# Patient Record
Sex: MALE | Race: WHITE | NOT HISPANIC OR LATINO | Employment: OTHER | URBAN - METROPOLITAN AREA
[De-identification: names, ages, dates, MRNs, and addresses within clinical notes are randomized per-mention and may not be internally consistent; named-entity substitution may affect disease eponyms.]

---

## 2021-01-15 DIAGNOSIS — Z23 NEED FOR VACCINATION: ICD-10-CM

## 2023-03-13 ENCOUNTER — APPOINTMENT (OUTPATIENT)
Dept: ADMISSIONS | Facility: MEDICAL CENTER | Age: 78
End: 2023-03-13
Attending: NEUROLOGICAL SURGERY
Payer: COMMERCIAL

## 2023-03-15 ENCOUNTER — PRE-ADMISSION TESTING (OUTPATIENT)
Dept: ADMISSIONS | Facility: MEDICAL CENTER | Age: 78
End: 2023-03-15
Attending: NEUROLOGICAL SURGERY
Payer: COMMERCIAL

## 2023-03-15 RX ORDER — TERAZOSIN 2 MG/1
2 CAPSULE ORAL NIGHTLY
COMMUNITY

## 2023-03-15 RX ORDER — FINASTERIDE 5 MG/1
5 TABLET, FILM COATED ORAL DAILY
COMMUNITY

## 2023-03-15 RX ORDER — CHLORAL HYDRATE 500 MG
1000 CAPSULE ORAL
Status: ON HOLD | COMMUNITY
End: 2023-03-16

## 2023-03-16 ENCOUNTER — ANESTHESIA (OUTPATIENT)
Dept: SURGERY | Facility: MEDICAL CENTER | Age: 78
End: 2023-03-16
Payer: COMMERCIAL

## 2023-03-16 ENCOUNTER — HOSPITAL ENCOUNTER (OUTPATIENT)
Facility: MEDICAL CENTER | Age: 78
End: 2023-03-18
Attending: NEUROLOGICAL SURGERY | Admitting: NEUROLOGICAL SURGERY
Payer: COMMERCIAL

## 2023-03-16 ENCOUNTER — ANESTHESIA EVENT (OUTPATIENT)
Dept: SURGERY | Facility: MEDICAL CENTER | Age: 78
End: 2023-03-16
Payer: COMMERCIAL

## 2023-03-16 ENCOUNTER — APPOINTMENT (OUTPATIENT)
Dept: RADIOLOGY | Facility: MEDICAL CENTER | Age: 78
End: 2023-03-16
Attending: NEUROLOGICAL SURGERY
Payer: COMMERCIAL

## 2023-03-16 DIAGNOSIS — G89.18 ACUTE POST-OPERATIVE PAIN: ICD-10-CM

## 2023-03-16 DIAGNOSIS — M48.062 LUMBAR STENOSIS WITH NEUROGENIC CLAUDICATION: ICD-10-CM

## 2023-03-16 LAB — GLUCOSE BLD STRIP.AUTO-MCNC: 142 MG/DL (ref 65–99)

## 2023-03-16 PROCEDURE — 110371 HCHG SHELL REV 272: Performed by: NEUROLOGICAL SURGERY

## 2023-03-16 PROCEDURE — 700101 HCHG RX REV CODE 250: Performed by: NURSE PRACTITIONER

## 2023-03-16 PROCEDURE — 700102 HCHG RX REV CODE 250 W/ 637 OVERRIDE(OP): Performed by: NURSE PRACTITIONER

## 2023-03-16 PROCEDURE — 700111 HCHG RX REV CODE 636 W/ 250 OVERRIDE (IP): Performed by: NEUROLOGICAL SURGERY

## 2023-03-16 PROCEDURE — 700101 HCHG RX REV CODE 250: Performed by: NEUROLOGICAL SURGERY

## 2023-03-16 PROCEDURE — 82962 GLUCOSE BLOOD TEST: CPT

## 2023-03-16 PROCEDURE — 700105 HCHG RX REV CODE 258: Performed by: NURSE PRACTITIONER

## 2023-03-16 PROCEDURE — 160035 HCHG PACU - 1ST 60 MINS PHASE I: Performed by: NEUROLOGICAL SURGERY

## 2023-03-16 PROCEDURE — G0378 HOSPITAL OBSERVATION PER HR: HCPCS

## 2023-03-16 PROCEDURE — 700111 HCHG RX REV CODE 636 W/ 250 OVERRIDE (IP): Performed by: ANESTHESIOLOGY

## 2023-03-16 PROCEDURE — 700105 HCHG RX REV CODE 258: Performed by: NEUROLOGICAL SURGERY

## 2023-03-16 PROCEDURE — 700102 HCHG RX REV CODE 250 W/ 637 OVERRIDE(OP): Performed by: ANESTHESIOLOGY

## 2023-03-16 PROCEDURE — 110454 HCHG SHELL REV 250: Performed by: NEUROLOGICAL SURGERY

## 2023-03-16 PROCEDURE — 700101 HCHG RX REV CODE 250: Performed by: ANESTHESIOLOGY

## 2023-03-16 PROCEDURE — 160041 HCHG SURGERY MINUTES - EA ADDL 1 MIN LEVEL 4: Performed by: NEUROLOGICAL SURGERY

## 2023-03-16 PROCEDURE — A9270 NON-COVERED ITEM OR SERVICE: HCPCS | Performed by: ANESTHESIOLOGY

## 2023-03-16 PROCEDURE — 700111 HCHG RX REV CODE 636 W/ 250 OVERRIDE (IP): Performed by: NURSE PRACTITIONER

## 2023-03-16 PROCEDURE — 99100 ANES PT EXTEME AGE<1 YR&>70: CPT | Performed by: ANESTHESIOLOGY

## 2023-03-16 PROCEDURE — 160009 HCHG ANES TIME/MIN: Performed by: NEUROLOGICAL SURGERY

## 2023-03-16 PROCEDURE — 160029 HCHG SURGERY MINUTES - 1ST 30 MINS LEVEL 4: Performed by: NEUROLOGICAL SURGERY

## 2023-03-16 PROCEDURE — A9270 NON-COVERED ITEM OR SERVICE: HCPCS | Performed by: NURSE PRACTITIONER

## 2023-03-16 PROCEDURE — 160002 HCHG RECOVERY MINUTES (STAT): Performed by: NEUROLOGICAL SURGERY

## 2023-03-16 PROCEDURE — 72020 X-RAY EXAM OF SPINE 1 VIEW: CPT

## 2023-03-16 PROCEDURE — 00630 ANES PX LUMBAR REGION NOS: CPT | Performed by: ANESTHESIOLOGY

## 2023-03-16 PROCEDURE — 160048 HCHG OR STATISTICAL LEVEL 1-5: Performed by: NEUROLOGICAL SURGERY

## 2023-03-16 PROCEDURE — 160036 HCHG PACU - EA ADDL 30 MINS PHASE I: Performed by: NEUROLOGICAL SURGERY

## 2023-03-16 RX ORDER — TIZANIDINE 4 MG/1
2 TABLET ORAL EVERY 8 HOURS PRN
Qty: 30 TABLET | Refills: 0
Start: 2023-03-16

## 2023-03-16 RX ORDER — LABETALOL HYDROCHLORIDE 5 MG/ML
10 INJECTION, SOLUTION INTRAVENOUS
Status: DISCONTINUED | OUTPATIENT
Start: 2023-03-16 | End: 2023-03-18 | Stop reason: HOSPADM

## 2023-03-16 RX ORDER — AMOXICILLIN 250 MG
1 CAPSULE ORAL
Status: DISCONTINUED | OUTPATIENT
Start: 2023-03-16 | End: 2023-03-18 | Stop reason: HOSPADM

## 2023-03-16 RX ORDER — FINASTERIDE 5 MG/1
5 TABLET, FILM COATED ORAL DAILY
Status: DISCONTINUED | OUTPATIENT
Start: 2023-03-16 | End: 2023-03-18 | Stop reason: HOSPADM

## 2023-03-16 RX ORDER — ONDANSETRON 4 MG/1
4 TABLET, ORALLY DISINTEGRATING ORAL EVERY 4 HOURS PRN
Status: DISCONTINUED | OUTPATIENT
Start: 2023-03-16 | End: 2023-03-18 | Stop reason: HOSPADM

## 2023-03-16 RX ORDER — BUPIVACAINE HYDROCHLORIDE AND EPINEPHRINE 5; 5 MG/ML; UG/ML
INJECTION, SOLUTION EPIDURAL; INTRACAUDAL; PERINEURAL
Status: DISCONTINUED | OUTPATIENT
Start: 2023-03-16 | End: 2023-03-16 | Stop reason: HOSPADM

## 2023-03-16 RX ORDER — HYDROMORPHONE HYDROCHLORIDE 1 MG/ML
0.2 INJECTION, SOLUTION INTRAMUSCULAR; INTRAVENOUS; SUBCUTANEOUS
Status: DISCONTINUED | OUTPATIENT
Start: 2023-03-16 | End: 2023-03-16 | Stop reason: HOSPADM

## 2023-03-16 RX ORDER — EPHEDRINE SULFATE 50 MG/ML
5 INJECTION, SOLUTION INTRAVENOUS
Status: DISCONTINUED | OUTPATIENT
Start: 2023-03-16 | End: 2023-03-16 | Stop reason: HOSPADM

## 2023-03-16 RX ORDER — POLYETHYLENE GLYCOL 3350 17 G/17G
1 POWDER, FOR SOLUTION ORAL 2 TIMES DAILY PRN
Status: DISCONTINUED | OUTPATIENT
Start: 2023-03-16 | End: 2023-03-18 | Stop reason: HOSPADM

## 2023-03-16 RX ORDER — PANTOPRAZOLE SODIUM 20 MG/1
20 TABLET, DELAYED RELEASE ORAL DAILY
Status: DISCONTINUED | OUTPATIENT
Start: 2023-03-16 | End: 2023-03-16

## 2023-03-16 RX ORDER — CEFAZOLIN SODIUM 1 G/3ML
INJECTION, POWDER, FOR SOLUTION INTRAMUSCULAR; INTRAVENOUS PRN
Status: DISCONTINUED | OUTPATIENT
Start: 2023-03-16 | End: 2023-03-16 | Stop reason: SURG

## 2023-03-16 RX ORDER — OXYCODONE HYDROCHLORIDE 10 MG/1
10 TABLET ORAL
Status: DISCONTINUED | OUTPATIENT
Start: 2023-03-16 | End: 2023-03-18 | Stop reason: HOSPADM

## 2023-03-16 RX ORDER — LIDOCAINE HYDROCHLORIDE 20 MG/ML
INJECTION, SOLUTION EPIDURAL; INFILTRATION; INTRACAUDAL; PERINEURAL PRN
Status: DISCONTINUED | OUTPATIENT
Start: 2023-03-16 | End: 2023-03-16 | Stop reason: SURG

## 2023-03-16 RX ORDER — HYDRALAZINE HYDROCHLORIDE 20 MG/ML
10 INJECTION INTRAMUSCULAR; INTRAVENOUS
Status: DISCONTINUED | OUTPATIENT
Start: 2023-03-16 | End: 2023-03-18 | Stop reason: HOSPADM

## 2023-03-16 RX ORDER — PHENYLEPHRINE HCL IN 0.9% NACL 0.5 MG/5ML
SYRINGE (ML) INTRAVENOUS PRN
Status: DISCONTINUED | OUTPATIENT
Start: 2023-03-16 | End: 2023-03-16 | Stop reason: SURG

## 2023-03-16 RX ORDER — HYDRALAZINE HYDROCHLORIDE 20 MG/ML
5 INJECTION INTRAMUSCULAR; INTRAVENOUS
Status: DISCONTINUED | OUTPATIENT
Start: 2023-03-16 | End: 2023-03-16 | Stop reason: HOSPADM

## 2023-03-16 RX ORDER — HALOPERIDOL 5 MG/ML
1 INJECTION INTRAMUSCULAR
Status: DISCONTINUED | OUTPATIENT
Start: 2023-03-16 | End: 2023-03-16 | Stop reason: HOSPADM

## 2023-03-16 RX ORDER — HYDROMORPHONE HYDROCHLORIDE 1 MG/ML
0.5 INJECTION, SOLUTION INTRAMUSCULAR; INTRAVENOUS; SUBCUTANEOUS
Status: DISCONTINUED | OUTPATIENT
Start: 2023-03-16 | End: 2023-03-18 | Stop reason: HOSPADM

## 2023-03-16 RX ORDER — SIMVASTATIN 40 MG
40 TABLET ORAL NIGHTLY
Status: DISCONTINUED | OUTPATIENT
Start: 2023-03-16 | End: 2023-03-18 | Stop reason: HOSPADM

## 2023-03-16 RX ORDER — TIZANIDINE 4 MG/1
2 TABLET ORAL 3 TIMES DAILY PRN
Status: DISCONTINUED | OUTPATIENT
Start: 2023-03-16 | End: 2023-03-18 | Stop reason: HOSPADM

## 2023-03-16 RX ORDER — HYDROMORPHONE HYDROCHLORIDE 1 MG/ML
0.4 INJECTION, SOLUTION INTRAMUSCULAR; INTRAVENOUS; SUBCUTANEOUS
Status: DISCONTINUED | OUTPATIENT
Start: 2023-03-16 | End: 2023-03-16 | Stop reason: HOSPADM

## 2023-03-16 RX ORDER — OMEPRAZOLE 20 MG/1
20 CAPSULE, DELAYED RELEASE ORAL DAILY
Status: DISCONTINUED | OUTPATIENT
Start: 2023-03-16 | End: 2023-03-18 | Stop reason: HOSPADM

## 2023-03-16 RX ORDER — BISACODYL 10 MG
10 SUPPOSITORY, RECTAL RECTAL
Status: DISCONTINUED | OUTPATIENT
Start: 2023-03-16 | End: 2023-03-18 | Stop reason: HOSPADM

## 2023-03-16 RX ORDER — DIPHENHYDRAMINE HYDROCHLORIDE 50 MG/ML
25 INJECTION INTRAMUSCULAR; INTRAVENOUS EVERY 6 HOURS PRN
Status: DISCONTINUED | OUTPATIENT
Start: 2023-03-16 | End: 2023-03-18 | Stop reason: HOSPADM

## 2023-03-16 RX ORDER — OXYCODONE HYDROCHLORIDE AND ACETAMINOPHEN 5; 325 MG/1; MG/1
1 TABLET ORAL EVERY 4 HOURS PRN
Qty: 42 TABLET | Refills: 0
Start: 2023-03-16 | End: 2023-03-23

## 2023-03-16 RX ORDER — OXYCODONE HYDROCHLORIDE 5 MG/1
5 TABLET ORAL
Status: DISCONTINUED | OUTPATIENT
Start: 2023-03-16 | End: 2023-03-18 | Stop reason: HOSPADM

## 2023-03-16 RX ORDER — ENEMA 19; 7 G/133ML; G/133ML
1 ENEMA RECTAL
Status: DISCONTINUED | OUTPATIENT
Start: 2023-03-16 | End: 2023-03-18 | Stop reason: HOSPADM

## 2023-03-16 RX ORDER — DIPHENHYDRAMINE HCL 25 MG
25 TABLET ORAL EVERY 6 HOURS PRN
Status: DISCONTINUED | OUTPATIENT
Start: 2023-03-16 | End: 2023-03-18 | Stop reason: HOSPADM

## 2023-03-16 RX ORDER — DEXAMETHASONE SODIUM PHOSPHATE 4 MG/ML
INJECTION, SOLUTION INTRA-ARTICULAR; INTRALESIONAL; INTRAMUSCULAR; INTRAVENOUS; SOFT TISSUE PRN
Status: DISCONTINUED | OUTPATIENT
Start: 2023-03-16 | End: 2023-03-16 | Stop reason: SURG

## 2023-03-16 RX ORDER — SODIUM CHLORIDE AND POTASSIUM CHLORIDE 150; 900 MG/100ML; MG/100ML
INJECTION, SOLUTION INTRAVENOUS CONTINUOUS
Status: DISCONTINUED | OUTPATIENT
Start: 2023-03-16 | End: 2023-03-18 | Stop reason: HOSPADM

## 2023-03-16 RX ORDER — LISINOPRIL 20 MG/1
20 TABLET ORAL DAILY
Status: DISCONTINUED | OUTPATIENT
Start: 2023-03-16 | End: 2023-03-18 | Stop reason: HOSPADM

## 2023-03-16 RX ORDER — ONDANSETRON 2 MG/ML
4 INJECTION INTRAMUSCULAR; INTRAVENOUS EVERY 4 HOURS PRN
Status: DISCONTINUED | OUTPATIENT
Start: 2023-03-16 | End: 2023-03-18 | Stop reason: HOSPADM

## 2023-03-16 RX ORDER — CEFAZOLIN SODIUM 1 G/3ML
INJECTION, POWDER, FOR SOLUTION INTRAMUSCULAR; INTRAVENOUS
Status: DISCONTINUED | OUTPATIENT
Start: 2023-03-16 | End: 2023-03-16 | Stop reason: HOSPADM

## 2023-03-16 RX ORDER — ONDANSETRON 2 MG/ML
4 INJECTION INTRAMUSCULAR; INTRAVENOUS
Status: DISCONTINUED | OUTPATIENT
Start: 2023-03-16 | End: 2023-03-16 | Stop reason: HOSPADM

## 2023-03-16 RX ORDER — SODIUM CHLORIDE, SODIUM LACTATE, POTASSIUM CHLORIDE, CALCIUM CHLORIDE 600; 310; 30; 20 MG/100ML; MG/100ML; MG/100ML; MG/100ML
INJECTION, SOLUTION INTRAVENOUS CONTINUOUS
Status: ACTIVE | OUTPATIENT
Start: 2023-03-16 | End: 2023-03-16

## 2023-03-16 RX ORDER — ROCURONIUM BROMIDE 10 MG/ML
INJECTION, SOLUTION INTRAVENOUS PRN
Status: DISCONTINUED | OUTPATIENT
Start: 2023-03-16 | End: 2023-03-16 | Stop reason: SURG

## 2023-03-16 RX ORDER — OXYCODONE HCL 5 MG/5 ML
5 SOLUTION, ORAL ORAL
Status: COMPLETED | OUTPATIENT
Start: 2023-03-16 | End: 2023-03-16

## 2023-03-16 RX ORDER — TERAZOSIN 2 MG/1
2 CAPSULE ORAL NIGHTLY
Status: DISCONTINUED | OUTPATIENT
Start: 2023-03-16 | End: 2023-03-18 | Stop reason: HOSPADM

## 2023-03-16 RX ORDER — OXYCODONE HCL 5 MG/5 ML
10 SOLUTION, ORAL ORAL
Status: COMPLETED | OUTPATIENT
Start: 2023-03-16 | End: 2023-03-16

## 2023-03-16 RX ORDER — ACETAMINOPHEN 500 MG
1000 TABLET ORAL EVERY 8 HOURS
Status: DISCONTINUED | OUTPATIENT
Start: 2023-03-16 | End: 2023-03-18 | Stop reason: HOSPADM

## 2023-03-16 RX ORDER — ONDANSETRON 2 MG/ML
INJECTION INTRAMUSCULAR; INTRAVENOUS PRN
Status: DISCONTINUED | OUTPATIENT
Start: 2023-03-16 | End: 2023-03-16 | Stop reason: SURG

## 2023-03-16 RX ORDER — HYDROMORPHONE HYDROCHLORIDE 1 MG/ML
0.1 INJECTION, SOLUTION INTRAMUSCULAR; INTRAVENOUS; SUBCUTANEOUS
Status: DISCONTINUED | OUTPATIENT
Start: 2023-03-16 | End: 2023-03-16 | Stop reason: HOSPADM

## 2023-03-16 RX ORDER — AMOXICILLIN 250 MG
1 CAPSULE ORAL NIGHTLY
Status: DISCONTINUED | OUTPATIENT
Start: 2023-03-16 | End: 2023-03-18 | Stop reason: HOSPADM

## 2023-03-16 RX ORDER — LABETALOL HYDROCHLORIDE 5 MG/ML
5 INJECTION, SOLUTION INTRAVENOUS
Status: DISCONTINUED | OUTPATIENT
Start: 2023-03-16 | End: 2023-03-16 | Stop reason: HOSPADM

## 2023-03-16 RX ORDER — DOCUSATE SODIUM 100 MG/1
100 CAPSULE, LIQUID FILLED ORAL 2 TIMES DAILY
Status: DISCONTINUED | OUTPATIENT
Start: 2023-03-16 | End: 2023-03-18 | Stop reason: HOSPADM

## 2023-03-16 RX ADMIN — SODIUM CHLORIDE, POTASSIUM CHLORIDE, SODIUM LACTATE AND CALCIUM CHLORIDE: 600; 310; 30; 20 INJECTION, SOLUTION INTRAVENOUS at 07:29

## 2023-03-16 RX ADMIN — CEFAZOLIN 2 G: 2 INJECTION, POWDER, FOR SOLUTION INTRAMUSCULAR; INTRAVENOUS at 23:50

## 2023-03-16 RX ADMIN — ROCURONIUM BROMIDE 50 MG: 10 INJECTION, SOLUTION INTRAVENOUS at 07:32

## 2023-03-16 RX ADMIN — DEXAMETHASONE SODIUM PHOSPHATE 10 MG: 4 INJECTION, SOLUTION INTRA-ARTICULAR; INTRALESIONAL; INTRAMUSCULAR; INTRAVENOUS; SOFT TISSUE at 07:40

## 2023-03-16 RX ADMIN — OXYCODONE HYDROCHLORIDE 10 MG: 5 SOLUTION ORAL at 09:58

## 2023-03-16 RX ADMIN — ACETAMINOPHEN 1000 MG: 500 TABLET, FILM COATED ORAL at 21:14

## 2023-03-16 RX ADMIN — FENTANYL CITRATE 100 MCG: 50 INJECTION, SOLUTION INTRAMUSCULAR; INTRAVENOUS at 07:32

## 2023-03-16 RX ADMIN — PROPOFOL 150 MG: 10 INJECTION, EMULSION INTRAVENOUS at 07:32

## 2023-03-16 RX ADMIN — CEFAZOLIN 2 G: 330 INJECTION, POWDER, FOR SOLUTION INTRAMUSCULAR; INTRAVENOUS at 07:35

## 2023-03-16 RX ADMIN — ACETAMINOPHEN 1000 MG: 500 TABLET, FILM COATED ORAL at 14:41

## 2023-03-16 RX ADMIN — OMEPRAZOLE 20 MG: 20 CAPSULE, DELAYED RELEASE ORAL at 21:16

## 2023-03-16 RX ADMIN — DIPHENHYDRAMINE HYDROCHLORIDE 25 MG: 25 TABLET ORAL at 17:09

## 2023-03-16 RX ADMIN — FINASTERIDE 5 MG: 5 TABLET, FILM COATED ORAL at 21:14

## 2023-03-16 RX ADMIN — Medication 200 MCG: at 08:17

## 2023-03-16 RX ADMIN — Medication 100 MCG: at 08:10

## 2023-03-16 RX ADMIN — SIMVASTATIN 40 MG: 40 TABLET, FILM COATED ORAL at 21:14

## 2023-03-16 RX ADMIN — OXYCODONE HYDROCHLORIDE 5 MG: 5 TABLET ORAL at 14:41

## 2023-03-16 RX ADMIN — POTASSIUM CHLORIDE AND SODIUM CHLORIDE: 900; 150 INJECTION, SOLUTION INTRAVENOUS at 15:14

## 2023-03-16 RX ADMIN — Medication 100 MCG: at 08:01

## 2023-03-16 RX ADMIN — FENTANYL CITRATE 50 MCG: 50 INJECTION, SOLUTION INTRAMUSCULAR; INTRAVENOUS at 09:58

## 2023-03-16 RX ADMIN — CEFAZOLIN 2 G: 2 INJECTION, POWDER, FOR SOLUTION INTRAMUSCULAR; INTRAVENOUS at 16:40

## 2023-03-16 RX ADMIN — DOCUSATE SODIUM 100 MG: 100 CAPSULE, LIQUID FILLED ORAL at 17:09

## 2023-03-16 RX ADMIN — SUGAMMADEX 200 MG: 100 INJECTION, SOLUTION INTRAVENOUS at 09:38

## 2023-03-16 RX ADMIN — LIDOCAINE HYDROCHLORIDE 100 MG: 20 INJECTION, SOLUTION EPIDURAL; INFILTRATION; INTRACAUDAL at 07:32

## 2023-03-16 RX ADMIN — ONDANSETRON 4 MG: 2 INJECTION INTRAMUSCULAR; INTRAVENOUS at 09:22

## 2023-03-16 RX ADMIN — ROCURONIUM BROMIDE 20 MG: 10 INJECTION, SOLUTION INTRAVENOUS at 08:41

## 2023-03-16 RX ADMIN — TERAZOSIN HYDROCHLORIDE 2 MG: 2 CAPSULE ORAL at 21:14

## 2023-03-16 RX ADMIN — FENTANYL CITRATE 50 MCG: 50 INJECTION, SOLUTION INTRAMUSCULAR; INTRAVENOUS at 10:15

## 2023-03-16 RX ADMIN — LISINOPRIL 20 MG: 20 TABLET ORAL at 21:14

## 2023-03-16 ASSESSMENT — PAIN SCALES - GENERAL: PAIN_LEVEL: 6

## 2023-03-16 ASSESSMENT — PAIN DESCRIPTION - PAIN TYPE: TYPE: SURGICAL PAIN

## 2023-03-16 NOTE — CARE PLAN
The patient is Watcher - Medium risk of patient condition declining or worsening    Shift Goals  Clinical Goals: mobilize, pain control  Patient Goals: pain control, rest    Progress made toward(s) clinical / shift goals:    Problem: Pain - Standard  Goal: Alleviation of pain or a reduction in pain to the patient’s comfort goal  Outcome: Progressing   Oxy given PRN with +results. Educated pt on importance of pain control- pt verbalized understanding.    Problem: Fall Risk  Goal: Patient will remain free from falls  Outcome: Progressing   Bed alarm in use. Call light w/in reach. Instructed pt to call for assistance before getting OOB- pt verbalized understanding.     Patient is not progressing towards the following goals:

## 2023-03-16 NOTE — ANESTHESIA PREPROCEDURE EVALUATION
Case: [id number] Date/Time: 03/16/23 0715    Procedure: POSTERIOR L2-5 LAMINECTOMY, L2-S1 MEDIAL FACETECTOMY AND FORAMINOTOMY    Pre-op diagnosis: SPINAL STENOSIS OF LUMBAR REGION    Location: [facility] OR 05 / SURGERY [facility]    Surgeons: Jeremi Berg M.D.          Relevant Problems   No relevant active problems       Physical Exam    Airway   Mallampati: I  TM distance: >3 FB  Neck ROM: full       Cardiovascular - normal exam     Dental - normal exam           Pulmonary   Breath sounds clear to auscultation     Abdominal   (+) obese     Neurological - normal exam                 Anesthesia Plan    ASA 2       Plan - general       Airway plan will be ETT          Induction: intravenous      Pertinent diagnostic labs and testing reviewed    Informed Consent:    Anesthetic plan and risks discussed with patient.

## 2023-03-16 NOTE — PROGRESS NOTES
4 Eyes Skin Assessment Completed by JULIETTE Almendarez and JULIETTE Lomax.    Head WDL  Ears WDL  Nose WDL  Mouth WDL  Neck WDL  Breast/Chest WDL  Shoulder Blades WDL  Spine Incision  (R) Arm/Elbow/Hand WDL  (L) Arm/Elbow/Hand Redness and Blanching  Abdomen WDL  Groin WDL  Scrotum/Coccyx/Buttocks WDL  (R) Leg WDL  (L) Leg WDL  (R) Heel/Foot/Toe WDL  (L) Heel/Foot/Toe WDL          Devices In Places SCD's      Interventions In Place Pillows and Pressure Redistribution Mattress    Possible Skin Injury No    Pictures Uploaded Into Epic N/A  Wound Consult Placed N/A  RN Wound Prevention Protocol Ordered No

## 2023-03-16 NOTE — ANESTHESIA TIME REPORT
Anesthesia Start and Stop Event Times     Date Time Event    3/16/2023 0714 Ready for Procedure     0729 Anesthesia Start     0946 Anesthesia Stop        Responsible Staff  03/16/23    Name Role Begin End    America Carlson M.D. Anesth 0729 0946        Overtime Reason:  no overtime (within assigned shift)    Comments:

## 2023-03-16 NOTE — OR NURSING
Pt arrives from OR to PACU at 0944. Pt identification verified by team, pt placed on all monitors with alarms audible, report and care of pt received from Anesthesiologist and RN. Assessment completed, pt changed into hospital gown and provided with warm blankets.     1040- Attempt made to call pt spouse (Tarah), no answer. Pt aware that a room assignment is pending.

## 2023-03-16 NOTE — ANESTHESIA POSTPROCEDURE EVALUATION
Patient: Wu Storm    Procedure Summary     Date: 03/16/23 Room / Location: [facility] 05 / SURGERY [facility]    Anesthesia Start: 0729 Anesthesia Stop: 0946    Procedure: POSTERIOR L2-5 LAMINECTOMY, L2-S1 MEDIAL FACETECTOMY AND FORAMINOTOMY (Back) Diagnosis: (SPINAL STENOSIS OF LUMBAR REGION)    Surgeons: Jeremi Berg M.D. Responsible Provider: America Carlson M.D.    Anesthesia Type: general ASA Status: 2          Final Anesthesia Type: general  Last vitals  BP   Blood Pressure : 105/60    Temp   36.4 °C (97.6 °F)    Pulse   70   Resp   12    SpO2   97 %      Anesthesia Post Evaluation    Patient location during evaluation: PACU  Patient participation: complete - patient participated  Level of consciousness: awake and alert  Pain score: 6    Airway patency: patent  Anesthetic complications: no  Cardiovascular status: adequate  Respiratory status: acceptable  Hydration status: acceptable    PONV: none          No notable events documented.     Nurse Pain Score: 7 (NPRS)

## 2023-03-16 NOTE — PROGRESS NOTES
Per patient report, he self-caths at home and last cathed around 0430 or 0500. Patient requested intra-operative Purvis and surgeon gave order to place one. When staff attempted to place Purvis, a great deal of resistance was met. Was going to switch to a coude catheter, but surgeon said to skip Purvis placement. When patient's was placed in prone position, he had a small episode of incontinence.     --Adriana Muro, RN

## 2023-03-16 NOTE — ANESTHESIA PROCEDURE NOTES
Airway    Date/Time: 3/16/2023 7:33 AM  Performed by: America Carlson M.D.  Authorized by: America Carlson M.D.     Location:  OR  Urgency:  Elective  Indications for Airway Management:  Anesthesia      Spontaneous Ventilation: absent    Sedation Level:  Deep  Preoxygenated: Yes    Patient Position:  Sniffing  Mask Difficulty Assessment:  1 - vent by mask  Final Airway Type:  Endotracheal airway  Final Endotracheal Airway:  ETT  Cuffed: Yes    Technique Used for Successful ETT Placement:  Direct laryngoscopy    Insertion Site:  Oral  Blade Type:  Tristan  Laryngoscope Blade/Videolaryngoscope Blade Size:  3  ETT Size (mm):  7.0  Measured from:  Teeth  ETT to Teeth (cm):  22  Placement Verified by: auscultation and capnometry    Cormack-Lehane Classification:  Grade IIa - partial view of glottis  Number of Attempts at Approach:  1

## 2023-03-16 NOTE — OP REPORT
Surgeon Jeremi Berg  First assist Roney Daly    Preoperative diagnosis neurogenic claudication with lumbar stenosis  Postoperative diagnosis same  Procedure was L2, L3, L4, L5 complete laminectomy  L2-3, L3-4, L4-5, L5-S1 bilateral medial facetectomy and foraminotomies    Complications none  Blood loss 300 cc  Medium Hemovac    Brief HPI see op preop note  Consent was obtained for the patient is wife present risk complication occasion of surgery which included but not limited to need for more surgery.    Procedure in detail the patient brought the operating was placed under general anesthesia with endotracheal ovation was then rotated onto a Brien table with Chris frame he was then clipped prepped draped in sterile usual fashion for a lower lumbar decompression surgical timeout was then performed confirm the correct side site procedure and patient with all faculty and staff agreeing a midline incision was made with a 10 blade sterile scalpel after infiltrating with Marcaine with epinephrine we then dissect an avascular plane exposing the L2 L3-L4-L5 spinous processes then took a lateral fluoroscopy shot to confirm the appropriate level once that was done we then remove the spinous process using Leksell rongeur as well as most of the lamina if possible and then we used a 5 mm round ball bur to eggshell the residual lamina and shaved down the medial facets sequentially at L2-3 L3-4 L4-5 L5-S1 once the lamina was eggshell we then remove the ligamentum flavum using a Leksell rongeur until we could see epidural fat placed a one by one shelley down in between each one of the lamina and then remove the residual lamina and lateral material until we reached vertical with the pedicle at L2-L3-L4 and L5 once we had completed that removal of the lamina we then shaved down the medial facets again using the combination of the drill and the Kerrison punches until they were again vertical with the pedicle at L2-3, L3-4, L4-5, L5-S1  bilaterally we then used this combination of the Kerrison and the curved Kerrisons to remove all the redundant material out the foramen at L2-3, L3-4, L4-5, L5-S1 until we could pass a large Thomas Kingsley ball-tipped probe out each foramina with no redundancy once this was completed we then achieved appropriate hemostasis by bipolar and of the epidural veins and then packing some Gelfoam block in between the axillas of the nerve roots at each level once that was completed the hemostasis had been achieved we then irrigated the wound thoroughly bacitracin irrigation placed a medium Hemovac and then closed the wound in layers using 0 Vicryl's on fascia 2-0 Vicryl's on dermis and then Monocryl with Dermabond on the skin.    All counts were correct x2 myself and my nurse practitioner was present for the entirety of the case she was necessary she assisted with opening closure retraction of thecal elements and hemostasis  Jeremi Berg MD

## 2023-03-17 PROCEDURE — 700102 HCHG RX REV CODE 250 W/ 637 OVERRIDE(OP): Performed by: NURSE PRACTITIONER

## 2023-03-17 PROCEDURE — G0378 HOSPITAL OBSERVATION PER HR: HCPCS

## 2023-03-17 PROCEDURE — 97535 SELF CARE MNGMENT TRAINING: CPT

## 2023-03-17 PROCEDURE — A9270 NON-COVERED ITEM OR SERVICE: HCPCS | Performed by: NURSE PRACTITIONER

## 2023-03-17 PROCEDURE — 97162 PT EVAL MOD COMPLEX 30 MIN: CPT

## 2023-03-17 PROCEDURE — 97165 OT EVAL LOW COMPLEX 30 MIN: CPT

## 2023-03-17 RX ADMIN — TERAZOSIN HYDROCHLORIDE 2 MG: 2 CAPSULE ORAL at 20:23

## 2023-03-17 RX ADMIN — ACETAMINOPHEN 1000 MG: 500 TABLET, FILM COATED ORAL at 06:53

## 2023-03-17 RX ADMIN — DOCUSATE SODIUM 100 MG: 100 CAPSULE, LIQUID FILLED ORAL at 06:53

## 2023-03-17 RX ADMIN — ACETAMINOPHEN 1000 MG: 500 TABLET, FILM COATED ORAL at 21:27

## 2023-03-17 RX ADMIN — OXYCODONE HYDROCHLORIDE 10 MG: 10 TABLET ORAL at 10:27

## 2023-03-17 RX ADMIN — SENNOSIDES AND DOCUSATE SODIUM 1 TABLET: 50; 8.6 TABLET ORAL at 20:23

## 2023-03-17 RX ADMIN — OXYCODONE HYDROCHLORIDE 10 MG: 10 TABLET ORAL at 20:23

## 2023-03-17 RX ADMIN — OXYCODONE HYDROCHLORIDE 10 MG: 10 TABLET ORAL at 01:51

## 2023-03-17 RX ADMIN — SIMVASTATIN 40 MG: 40 TABLET, FILM COATED ORAL at 20:23

## 2023-03-17 ASSESSMENT — GAIT ASSESSMENTS
GAIT LEVEL OF ASSIST: SUPERVISED
DISTANCE (FEET): 400
DEVIATION: INCREASED BASE OF SUPPORT;BRADYKINETIC
ASSISTIVE DEVICE: 4 WHEEL WALKER

## 2023-03-17 ASSESSMENT — COGNITIVE AND FUNCTIONAL STATUS - GENERAL
DRESSING REGULAR LOWER BODY CLOTHING: A LOT
TURNING FROM BACK TO SIDE WHILE IN FLAT BAD: A LITTLE
CLIMB 3 TO 5 STEPS WITH RAILING: A LOT
DRESSING REGULAR UPPER BODY CLOTHING: A LITTLE
MOVING TO AND FROM BED TO CHAIR: A LITTLE
HELP NEEDED FOR BATHING: A LITTLE
MOBILITY SCORE: 16
SUGGESTED CMS G CODE MODIFIER MOBILITY: CK
MOVING FROM LYING ON BACK TO SITTING ON SIDE OF FLAT BED: UNABLE
DAILY ACTIVITIY SCORE: 18
PERSONAL GROOMING: A LITTLE
STANDING UP FROM CHAIR USING ARMS: A LITTLE
SUGGESTED CMS G CODE MODIFIER DAILY ACTIVITY: CK
TOILETING: A LITTLE

## 2023-03-17 ASSESSMENT — ACTIVITIES OF DAILY LIVING (ADL): TOILETING: INDEPENDENT

## 2023-03-17 ASSESSMENT — PAIN DESCRIPTION - PAIN TYPE
TYPE: SURGICAL PAIN
TYPE: SURGICAL PAIN
TYPE: ACUTE PAIN
TYPE: SURGICAL PAIN
TYPE: ACUTE PAIN;SURGICAL PAIN
TYPE: ACUTE PAIN;SURGICAL PAIN

## 2023-03-17 NOTE — CARE PLAN
The patient is Stable - Low risk of patient condition declining or worsening    Shift Goals  Clinical Goals: mobilize, pain control  Patient Goals: pain control, rest  Family Goals: N/A    Progress made toward(s) clinical / shift goals:  Plan of care and medications discussed with pt. Pt reported of back/surgical site pain. Medicated per MAR for pain. Pain tolerable after interventions. Neuro status remain stable. No acute changes. Pt ambulated to bathroom multiple times to with FWW.      Problem: Knowledge Deficit - Standard  Goal: Patient and family/care givers will demonstrate understanding of plan of care, disease process/condition, diagnostic tests and medications  Outcome: Progressing     Problem: Pain - Standard  Goal: Alleviation of pain or a reduction in pain to the patient’s comfort goal  Outcome: Progressing     Problem: Hemodynamics  Goal: Patient's hemodynamics, fluid balance and neurologic status will be stable or improve  Outcome: Progressing

## 2023-03-17 NOTE — DISCHARGE INSTRUCTIONS
Ok to shower 24hrs after drain removal, pat incision dry, leave open to air- no dressing   No Aspirin, NSAIDs (Ibuprofen, Aleve, Motrin etc), or over the counter supplements for 10 days after surgery  No driving for 2 weeks/ No driving while on narcotic medication  Over the counter stool softeners daily while on narcotics  No lifting greater than 10 pounds, no repetitive bending or twisting  Follow up at [facility] 2 weeks after surgery

## 2023-03-17 NOTE — THERAPY
"Physical Therapy   Initial Evaluation     Patient Name: Wu Storm  Age:  77 y.o., Sex:  male  Medical Record #: [id number]  Today's Date: 3/17/2023     Precautions  Precautions: Fall Risk;Spinal / Back Precautions   Comments: no brace per orders    Assessment    Pt is a 78 y/o male admitted for elective L2-L5 laminectomy and L2-S1 B medial facetectomy and foraminotomies on 3/16. PMHx not limited to neurogenic claudication with stenosis, obesity. Pt presented with mild pain, impaired mobility, balance, SOB during ambulation without desat, and decreased activity tolerance and endurance from baseline. However, Pt demonstrated bed mobility with SBA, STS and chair transfer with 4WW and SBA, and 400' ambulation with 4WW and SPV. Pt receptive to spinal education and demonstrated good spinal precaution adherence. Due to demonstration of safe home ambulation and good family support, will d/c PT. Recommend outpatient therapy once cleared upon d/c. No DME needs.     Plan    Physical Therapy Initial Treatment Plan   Duration: Evaluation only    DC Equipment Recommendations: None (owns needed equipment)  Discharge Recommendations: Recommend outpatient physical therapy services to address higher level deficits       Subjective    \"I haven't been walking as much lately because of my back\"     Objective       03/17/23 0957   Cosignature   Documentation Review Approved with modifications made by preceptor in flowsheet   Initial Contact Note    Initial Contact Note Order Received and Verified, Evaluation Only - Patient Does Not Require Further Acute Physical Therapy at this Time.  However, May Benefit from Post Acute Therapy for Higher Level Functional Deficits.   Precautions   Precautions Fall Risk;Spinal / Back Precautions    Comments no brace per orders   Vitals   Pulse (!) 123  (123 post ambulation, 119 post 2 min seated break)   Pulse Oximetry 93 %  (93 post ambulation, 96 post 2 min seated break)   O2 (LPM) 0   O2 Delivery " Device None - Room Air   Vitals Comments Pt reported no dizziness throughout session. Pt SOB after 300 ft ambulation, education provided regarding activity pacing   Pain 0 - 10 Group   Therapist Pain Assessment During Activity;Nurse Notified  (Pt reported no pain throughout sessoin)   Prior Living Situation   Prior Services None   Housing / Facility 1 Story Apartment / Condo   Steps Into Home 0   Steps In Home 0   Elevator Yes   Bathroom Set up Bathtub / Shower Combination;Grab Bars;Shower Chair   Equipment Owned 4-Wheel Walker;Single Point Cane;Front-Wheel Walker;Tub / Shower Seat;Grab Bar(s) In Tub / Shower;Grab Bar(s) By Toilet;Bed Side Commode   Lives with - Patient's Self Care Capacity Spouse   Comments Pt lives with spouse who is home full time (retired) and able to assist as needed per pt report. Pt reports his wife will be able to drive him until he is cleared by his physician to drive. Lives in 2nd story apartment with elevator access   Prior Level of Functional Mobility   Bed Mobility Independent   Transfer Status Independent   Ambulation Independent   Ambulation Distance   (limited community)   Assistive Devices Used 4-Wheel Walker   Comments Pt reports being independent with mobility, but reports getting SOB after walking for a few minutes. He needs to take seated breaks when walking through large grocery stores   Cognition    Cognition / Consciousness WDL   Level of Consciousness Alert   Comments Very pleasant and cooperative. Reports no concerns regarding d/c home   Active ROM Upper Body   Active ROM Upper Body  WDL   Comments for functional mobility   Strength Upper Body   Upper Body Strength  WDL   Comments for functional mobility. Grossly 4/5 B UE   Sensation Upper Body   Upper Extremity Sensation  WDL   Comments no N/T reported   Upper Body Muscle Tone   Upper Body Muscle Tone  WDL   Active ROM Lower Body    Active ROM Lower Body  WDL   Comments for functional mobility   Strength Lower Body   Lower  Body Strength  WDL   Comments for functional mobility. Grossly 4/5 B LE   Sensation Lower Body   Lower Extremity Sensation   WDL   Comments no N/T reported   Lower Body Muscle Tone   Lower Body Muscle Tone  WDL   Neurological Concerns   Neurological Concerns No   Coordination Upper Body   Coordination WDL   Comments not formally assessed, good coordination demo'ed during mobility tasks   Coordination Lower Body    Coordination Lower Body  WDL   Comments not formally assessed, good coordination demo'ed during mobility tasks   Vision   Vision Comments no changes reported   Other Treatments   Other Treatments Provided education regarding spinal precautions, discharge disposition, activity pacing and talk test, compensatory strategies, and self-management training   Balance Assessment   Sitting Balance (Static) Good   Sitting Balance (Dynamic) Fair +   Standing Balance (Static) Fair +   Standing Balance (Dynamic) Fair   Weight Shift Sitting Fair   Weight Shift Standing Fair   Comments with 4WW   Bed Mobility    Supine to Sit Standby Assist   Sit to Supine   (up in chair post)   Scooting Supervised   Rolling Standby Assist   Comments via log roll. Required increased time and energy 2/2 to pain and spinal precautions   Gait Analysis   Gait Level Of Assist Supervised   Assistive Device 4 Wheel Walker   Distance (Feet) 400   # of Times Distance was Traveled 1   Deviation Increased Base Of Support;Bradykinetic   # of Stairs Climbed 0   Weight Bearing Status no restrictions   Comments required slight increased time and effort 2/2 to SOB and fatigue   Functional Mobility   Sit to Stand Standby Assist   Bed, Chair, Wheelchair Transfer Standby Assist   Transfer Method Stand Step   Mobility supine > EOB > ambulate in moran > up in chair post   Comments required increased time and effort 2/2 to pain   How much difficulty does the patient currently have...   Turning over in bed (including adjusting bedclothes, sheets and blankets)?  3   Sitting down on and standing up from a chair with arms (e.g., wheelchair, bedside commode, etc.) 1  (4WW)   Moving from lying on back to sitting on the side of the bed? 3   How much help from another person does the patient currently need...   Moving to and from a bed to a chair (including a wheelchair)? 3   Need to walk in a hospital room? 4   Climbing 3-5 steps with a railing? 2   6 clicks Mobility Score 16   Activity Tolerance   Sitting in Chair > 15 min  (up in chair post)   Sitting Edge of Bed 5 min   Standing 10 min   Comments decreased tolerance in standing 2/2 to SOB and fatigue   Education Group   Education Provided Role of Physical Therapist;Gait Training;Use of Assistive Device;Spine Precautions   Spine Precautions Patient Response Patient;Acceptance;Explanation;Demonstration;Handout;Action Demonstration;Verbal Demonstration   Role of Physical Therapist Patient Response Patient;Acceptance;Explanation;Verbal Demonstration   Gait Training Patient Response Patient;Acceptance;Explanation;Action Demonstration   Use of Assistive Device Patient Response Patient;Acceptance;Explanation;Action Demonstration   Physical Therapy Initial Treatment Plan    Duration Evaluation only   Problem List    Problems Pain;Impaired Bed Mobility;Impaired Transfers;Impaired Ambulation;Functional ROM Deficit;Functional Strength Deficit;Impaired Balance;Decreased Activity Tolerance;Limited Knowledge of Post-Op Precautions   Anticipated Discharge Equipment and Recommendations   DC Equipment Recommendations None  (owns needed equipment)   Discharge Recommendations Recommend outpatient physical therapy services to address higher level deficits   Interdisciplinary Plan of Care Collaboration   IDT Collaboration with  Nursing   Patient Position at End of Therapy Seated;Chair Alarm On;Call Light within Reach;Tray Table within Reach;Phone within Reach   Collaboration Comments RN updated   Session Information   Date / Session Number  3/17:  Eval only, d/c PT

## 2023-03-17 NOTE — THERAPY
Occupational Therapy   Initial Evaluation     Patient Name: Wu Storm  Age:  77 y.o., Sex:  male  Medical Record #: [id number]  Today's Date: 3/17/2023     Precautions  Precautions: Fall Risk, Spinal / Back Precautions   Comments: no brace per orders    Assessment    Patient is 77 y.o. male s/p elective L2-5 laminectomy and L2-S1 B medial facetectomy and foraminotomies. Other pertinent medical history includes neurogenic claudication with stenosis and obesity. Pt seen for OT evaluation. Pt stood to brush teeth and performed toilet transfer w/ SBA. Pt required mod A to don/doff socks, but declined further education regarding AE for LB dressing reporting that his spouse can assist him. Pt's spouse present for end of evaluation and very supportive. Pt educated regarding spinal precautions in relation to ADLs and the role of OT. No further OT needs anticipated at this time.     Plan     Pt seen for OT eval only.     DC Equipment Recommendations: None (already has tub transfer bench)  Discharge Recommendations: Anticipate that the patient will have no further occupational therapy needs after discharge from the hospital      Objective     03/17/23 1036   Prior Living Situation   Prior Services None   Housing / Facility 1 Story Apartment / Condo  (on second floor, elevator available)   Steps Into Home 0   Steps In Home 0   Elevator Yes   Bathroom Set up Bathtub / Shower Combination;Grab Bars;Tub Transfer Bench   Equipment Owned 4-Wheel Walker;Single Point Cane;Front-Wheel Walker;Tub / Shower Seat;Grab Bar(s) In Tub / Shower;Grab Bar(s) By Toilet;Bed Side Commode   Lives with - Patient's Self Care Capacity Spouse   Comments Pt lives with his spouse who was present for the end of the eval. She is supportive and reported ability to assist as needed.   Prior Level of ADL Function   Self Feeding Independent   Grooming / Hygiene Independent   Bathing Independent   Dressing Independent   Toileting Independent   Prior Level of  IADL Function   Medication Management Independent   Laundry Independent   Kitchen Mobility Independent   Finances Independent   Home Management Independent   Shopping Independent   Prior Level Of Mobility Independent With Device in Community;Independent With Device in Home   Driving / Transportation Driving Independent   Occupation (Pre-Hospital Vocational) Retired Due To Age   Vitals   Pulse (!) 110  (at end of eval)   Non Verbal Descriptors   Non Verbal Scale  Calm   Cognition    Cognition / Consciousness WDL   Level of Consciousness Alert   Comments Very pleasant and cooperative, receptive to education   Passive ROM Upper Body   Passive ROM Upper Body WDL   Active ROM Upper Body   Active ROM Upper Body  WDL   Strength Upper Body   Upper Body Strength  WDL   Sensation Upper Body   Upper Extremity Sensation  WDL   Upper Body Muscle Tone   Upper Body Muscle Tone  WDL   Coordination Upper Body   Comments WFL, not formally assessed   Balance Assessment   Sitting Balance (Static) Good   Sitting Balance (Dynamic) Fair +   Standing Balance (Static) Fair   Standing Balance (Dynamic) Fair   Weight Shift Sitting Fair   Weight Shift Standing Fair   Comments w/ 4WW   Bed Mobility    Comments up to chair pre/post   ADL Assessment   Grooming Standby Assist;Standing  (brushed teeth at sink)   Lower Body Dressing Moderate Assist  (reported comfort and plans to have spouse assist, politely declined further education on AE for LB dressing)   Toileting   (toilet transfer only)   Functional Mobility   Sit to Stand Standby Assist   Bed, Chair, Wheelchair Transfer Standby Assist   Toilet Transfers Standby Assist   Transfer Method Stand Step   Mobility chair>bathroom>chair   Comments w/ 4WW   Visual Perception   Visual Perception  Not Tested   Activity Tolerance   Sitting in Chair up to chair pre/post   Sitting Edge of Bed NT   Standing ~10 min   Comments no reports of pain or fatigue, minimal increased WOB, HR up to 110 upon return  from bathroom, O2 WDL   Education Group   Education Provided Role of Occupational Therapist;Activities of Daily Living;Spinal Precautions;Home Safety   Role of Occupational Therapist Patient Response Patient;Acceptance;Explanation;Verbal Demonstration   Spinal Precautions Patient Response Patient;Acceptance;Explanation;Demonstration;Handout;Action Demonstration;Verbal Demonstration   Home Safety Patient Response Patient;Acceptance;Explanation;Verbal Demonstration  (shower safety)   ADL Patient Response Patient;Acceptance;Explanation;Demonstration;Verbal Demonstration;Action Demonstration

## 2023-03-17 NOTE — PROGRESS NOTES
Neurosurgery Progress Note    Subjective:  No acute events over night  +back pain, controlled  No radiculopathy  Self cath without problem    Exam:  A/Ox4  BLE 5/5  Incision CDI and flat  HVAC 60ml/8hrs, bloody    BP  Min: 98/56  Max: 148/85  Pulse  Av.9  Min: 69  Max: 117  Resp  Av.8  Min: 12  Max: 19  Temp  Av.8 °C (98.2 °F)  Min: 36.4 °C (97.6 °F)  Max: 37.2 °C (99 °F)  SpO2  Av.1 %  Min: 92 %  Max: 97 %    No data recorded                      Intake/Output                         23 07 - 23 0659 23 - 23 0659     [id number] 6662-4473 Total 6502-6791 0582-9403 Total                 Intake    P.O.  480  -- 480  --  -- --    P.O. 480 -- 480 -- -- --    I.V.  900  -- 900  --  -- --    Volume (mL) (lactated ringers infusion) 900 -- 900 -- -- --    Total Intake 1380 -- 1380 -- -- --       Output    Drains  --  280 280  --  -- --    Output (mL) (Closed/Suction Drain 1 Inferior;Midline Back Hemovac 10 Fr.) -- 280 280 -- -- --    Stool  --  -- --  --  -- --    Number of Times Stooled 1 x -- 1 x -- -- --    Total Output -- 280 280 -- -- --       Net I/O     1380 -280 1100 -- -- --              Intake/Output Summary (Last 24 hours) at 3/17/2023 0846  Last data filed at 3/17/2023 0400  Gross per 24 hour   Intake 1380 ml   Output 280 ml   Net 1100 ml             finasteride  5 mg DAILY    lisinopril  20 mg DAILY    simvastatin  40 mg Nightly    terazosin  2 mg Nightly    Pharmacy Consult Request  1 Each PHARMACY TO DOSE    MD ALERT...DO NOT ADMINISTER NSAIDS or ASPIRIN unless ORDERED By Neurosurgery  1 Each PRN    docusate sodium  100 mg BID    senna-docusate  1 Tablet Nightly    senna-docusate  1 Tablet Q24HRS PRN    polyethylene glycol/lytes  1 Packet BID PRN    magnesium hydroxide  30 mL QDAY PRN    bisacodyl  10 mg Q24HRS PRN    sodium phosphate  1 Each Once PRN    0.9 % NaCl with KCl 20 mEq 1,000 mL   Continuous    acetaminophen  1,000 mg Q8HRS    oxyCODONE  immediate-release  5 mg Q3HRS PRN    Or    oxyCODONE immediate-release  10 mg Q3HRS PRN    Or    HYDROmorphone  0.5 mg Q3HRS PRN    diphenhydrAMINE  25 mg Q6HRS PRN    Or    diphenhydrAMINE  25 mg Q6HRS PRN    ondansetron  4 mg Q4HRS PRN    ondansetron  4 mg Q4HRS PRN    tizanidine  2 mg TID PRN    hydrALAZINE  10 mg Q HOUR PRN    labetalol  10 mg Q HOUR PRN    omeprazole  20 mg DAILY       Assessment and Plan:  Hospital day #2  POD #1 L2-5 lami  Prophylactic anticoagulation: no         Start date/time: na    VSS  Reports doing well and self cath without problem  Pain controlled  Pending Pt/OT eval  Has a walker at the bedside  Recheck drain at noon and remove if <50ml  Pending home Pt//OT eval and drain output  Will send post op prescriptions to [facility] per his request

## 2023-03-18 VITALS
DIASTOLIC BLOOD PRESSURE: 71 MMHG | HEART RATE: 88 BPM | SYSTOLIC BLOOD PRESSURE: 139 MMHG | TEMPERATURE: 97 F | HEIGHT: 64 IN | WEIGHT: 228.84 LBS | RESPIRATION RATE: 16 BRPM | OXYGEN SATURATION: 96 % | BODY MASS INDEX: 39.07 KG/M2

## 2023-03-18 PROCEDURE — 700102 HCHG RX REV CODE 250 W/ 637 OVERRIDE(OP): Performed by: NURSE PRACTITIONER

## 2023-03-18 PROCEDURE — A9270 NON-COVERED ITEM OR SERVICE: HCPCS | Performed by: NURSE PRACTITIONER

## 2023-03-18 PROCEDURE — G0378 HOSPITAL OBSERVATION PER HR: HCPCS

## 2023-03-18 RX ADMIN — OXYCODONE HYDROCHLORIDE 5 MG: 5 TABLET ORAL at 10:36

## 2023-03-18 RX ADMIN — ACETAMINOPHEN 1000 MG: 500 TABLET, FILM COATED ORAL at 04:23

## 2023-03-18 RX ADMIN — LISINOPRIL 20 MG: 20 TABLET ORAL at 04:23

## 2023-03-18 RX ADMIN — OMEPRAZOLE 20 MG: 20 CAPSULE, DELAYED RELEASE ORAL at 04:23

## 2023-03-18 RX ADMIN — FINASTERIDE 5 MG: 5 TABLET, FILM COATED ORAL at 04:23

## 2023-03-18 ASSESSMENT — PAIN DESCRIPTION - PAIN TYPE
TYPE: SURGICAL PAIN;ACUTE PAIN
TYPE: ACUTE PAIN;SURGICAL PAIN

## 2023-03-18 NOTE — CARE PLAN
The patient is Stable - Low risk of patient condition declining or worsening    Shift Goals  Clinical Goals: pain control, monitor hemovac output, mobility  Patient Goals: rest  Family Goals: not present    Progress made toward(s) clinical / shift goals:    Problem: Pain - Standard  Goal: Alleviation of pain or a reduction in pain to the patient’s comfort goal  Outcome: Progressing     Problem: Knowledge Deficit - Standard  Goal: Patient and family/care givers will demonstrate understanding of plan of care, disease process/condition, diagnostic tests and medications  Outcome: Progressing     Problem: Fall Risk  Goal: Patient will remain free from falls  Outcome: Progressing       Patient is not progressing towards the following goals:

## 2023-03-18 NOTE — PROGRESS NOTES
PIV removed with tip intact and site covered with gauze and coban. DC instructions provided. Requested that medications be sent to pts home pharmacy.

## 2023-03-18 NOTE — CARE PLAN
The patient is Stable - Low risk of patient condition declining or worsening    Shift Goals  Clinical Goals: Ambulate, monitor drain output and pain control  Patient Goals: Comfort  Family Goals: No family at bedside    Progress made toward(s) clinical / shift goals:  Rest     Patient is not progressing towards the following goals:

## 2023-03-18 NOTE — PROGRESS NOTES
Neurosurgery Progress Note    Subjective:  No acute events over night  Some back pain, controlled with medication  Only one episode of radiculopathy after walking a long period of time.  Self cath without problem    Exam:  A/Ox4  BLE 5/5  Incision CDI and flat  Hemovac site, c/d/i    BP  Min: 114/73  Max: 139/71  Pulse  Av.8  Min: 79  Max: 96  Resp  Av  Min: 16  Max: 20  Temp  Av.7 °C (98.1 °F)  Min: 36.1 °C (97 °F)  Max: 37.1 °C (98.8 °F)  SpO2  Av.3 %  Min: 92 %  Max: 96 %    No data recorded                      Intake/Output                         2300 - 2359 23 - 23 0659     [id number] 4782-1869 Total 8958-7503 1812-6129 Total                 Intake    P.O.  240  240 480  --  -- --    P.O. 240 240 480 -- -- --    Total Intake 240 240 480 -- -- --       Output    Urine  600  300 900  --  -- --    Number of Times Voided 1 x 1 x 2 x 2 x -- 2 x    Urine Void (mL) 600 300 900 -- -- --    Drains  120  60 180  30  -- 30    Output (mL) ([REMOVED] Closed/Suction Drain 1 Inferior;Midline Back Hemovac 10 Fr. 23 1222) 120 60 180 30 -- 30    Total Output  30 -- 30       Net I/O     -480 -120 -600 -30 -- -30              Intake/Output Summary (Last 24 hours) at 3/18/2023 1315  Last data filed at 3/18/2023 1134  Gross per 24 hour   Intake 240 ml   Output 430 ml   Net -190 ml               finasteride  5 mg DAILY    lisinopril  20 mg DAILY    simvastatin  40 mg Nightly    terazosin  2 mg Nightly    Pharmacy Consult Request  1 Each PHARMACY TO DOSE    MD ALERT...DO NOT ADMINISTER NSAIDS or ASPIRIN unless ORDERED By Neurosurgery  1 Each PRN    docusate sodium  100 mg BID    senna-docusate  1 Tablet Nightly    senna-docusate  1 Tablet Q24HRS PRN    polyethylene glycol/lytes  1 Packet BID PRN    magnesium hydroxide  30 mL QDAY PRN    bisacodyl  10 mg Q24HRS PRN    sodium phosphate  1 Each Once PRN    0.9 % NaCl with KCl 20 mEq 1,000 mL   Continuous     acetaminophen  1,000 mg Q8HRS    oxyCODONE immediate-release  5 mg Q3HRS PRN    Or    oxyCODONE immediate-release  10 mg Q3HRS PRN    Or    HYDROmorphone  0.5 mg Q3HRS PRN    diphenhydrAMINE  25 mg Q6HRS PRN    Or    diphenhydrAMINE  25 mg Q6HRS PRN    ondansetron  4 mg Q4HRS PRN    ondansetron  4 mg Q4HRS PRN    tizanidine  2 mg TID PRN    hydrALAZINE  10 mg Q HOUR PRN    labetalol  10 mg Q HOUR PRN    omeprazole  20 mg DAILY       Assessment and Plan:  Hospital day #3  POD #2 L2-5 lami  Prophylactic anticoagulation: no         Start date/time: na    Reports doing well and self cath without problem  Pain controlled with medication  PT/OT eval complete, PT recommending outpt PT.  Has a walker at the bedside  Post op prescriptions to [facility] per his request.

## 2023-03-18 NOTE — DISCHARGE SUMMARY
Discharge Summary    CHIEF COMPLAINT ON ADMISSION  No chief complaint on file.      Reason for Admission  Lumbar stenosis with neurogenic claudication     Admission Date  3/16/2023    CODE STATUS  Full Code    HPI & HOSPITAL COURSE  This is a 77 y.o. male here for L2-L5 laminectiomies . He was was admitted to the hospital on 3/16/2023 and underwent a L2-L5 laminectiomies and L2-3, L3-4, L4-5, and L5-S1 facetecomies and foraminotomies with Dr. Berg. He tolerated the procedure with no perioperative complications. He is adequately voiding by self catheterization which is baseline, tolerating their diet, and their pain is controlled. They have been cleared by PT and OT for discharge home with outpatient therapy recommendation.         Therefore, he is discharged in good and stable condition to home with close outpatient follow-up.    The patient recovered much more quickly than anticipated on admission.    Discharge Date  3/18/2023    DISCHARGE DIAGNOSES  Principal Problem:    Lumbar stenosis with neurogenic claudication POA: Yes  Resolved Problems:    * No resolved hospital problems. *      FOLLOW UP  Follow-up in clinic as previously scheduled.    MEDICATIONS ON DISCHARGE     Medication List        START taking these medications        Instructions   oxyCODONE-acetaminophen 5-325 MG Tabs  Commonly known as: PERCOCET   Take 1 Tablet by mouth every four hours as needed for Moderate Pain for up to 7 days.  Dose: 1 Tablet     tizanidine 4 MG Tabs  Commonly known as: ZANAFLEX   Take 0.5 Tablets by mouth every 8 hours as needed (muscle spasm).  Dose: 2 mg            CONTINUE taking these medications        Instructions   finasteride 5 MG Tabs  Commonly known as: PROSCAR   Take 5 mg by mouth every day.  Dose: 5 mg     IRON PO   Take 1 Capsule by mouth.  Dose: 1 Capsule     lisinopril 20 MG Tabs  Commonly known as: PRINIVIL   Take 20 mg by mouth every day.  Dose: 20 mg     PROTONIX PO   Take  by mouth every day.      simvastatin 40 MG Tabs  Commonly known as: ZOCOR   Take 40 mg by mouth every evening.  Dose: 40 mg     terazosin 2 MG Caps  Commonly known as: HYTRIN   Take 2 mg by mouth every evening.  Dose: 2 mg     VITAMIN D PO   Take 2,000 Int'l Units/day by mouth every day.  Dose: 2,000 Int'l Units/day            STOP taking these medications      aspirin 81 MG Chew chewable tablet  Commonly known as: ASA     fish oil 1000 MG Caps capsule     Osteo Advance Tabs     VITAMIN E PO              Allergies  Allergies   Allergen Reactions    Lidocaine-Transparent Dressing Rash     Lidocaine patch causes rash       DIET  Orders Placed This Encounter   Procedures    Diet Order Diet: Regular     Standing Status:   Standing     Number of Occurrences:   1     Order Specific Question:   Diet:     Answer:   Regular [1]       ACTIVITY  As tolerated.  Weight bearing as tolerated      LABORATORY  Lab Results   Component Value Date    SODIUM 120 (L) 06/27/2012    POTASSIUM 4.0 06/27/2012    CHLORIDE 88 (L) 06/27/2012    CO2 20 06/27/2012    GLUCOSE 110 (H) 06/27/2012    BUN 10 06/27/2012    CREATININE 0.82 06/27/2012        Lab Results   Component Value Date    WBC 9.8 06/27/2012    HEMOGLOBIN 14.1 06/27/2012    HEMATOCRIT 41.8 (L) 06/27/2012    PLATELETCT 183 06/27/2012        Total time of the discharge process exceeds 45 minutes.

## 2023-03-18 NOTE — CARE PLAN
Problem: Pain - Standard  Goal: Alleviation of pain or a reduction in pain to the patient’s comfort goal  Outcome: Progressing     Problem: Fall Risk  Goal: Patient will remain free from falls  Outcome: Progressing   The patient is Stable - Low risk of patient condition declining or worsening    Shift Goals  Clinical Goals: Pain <3/10, hemovac<30  Patient Goals: rest, D/C  Family Goals: not present    Progress made toward(s) clinical / shift goals: paIN <3, HEMOVAC <30 and removed.    Patient is not progressing towards the following goals:

## 2023-03-24 ENCOUNTER — HOSPITAL ENCOUNTER (OUTPATIENT)
Dept: RADIOLOGY | Facility: MEDICAL CENTER | Age: 78
End: 2023-03-24
Attending: PHYSICIAN ASSISTANT
Payer: COMMERCIAL

## 2023-03-24 DIAGNOSIS — M79.89 SWELLING OF LIMB: ICD-10-CM

## 2023-03-24 PROCEDURE — 93970 EXTREMITY STUDY: CPT

## 2024-04-09 ENCOUNTER — OFFICE VISIT (OUTPATIENT)
Dept: URGENT CARE | Facility: PHYSICIAN GROUP | Age: 79
End: 2024-04-09
Payer: COMMERCIAL

## 2024-04-09 VITALS
DIASTOLIC BLOOD PRESSURE: 82 MMHG | SYSTOLIC BLOOD PRESSURE: 142 MMHG | OXYGEN SATURATION: 95 % | TEMPERATURE: 97.6 F | BODY MASS INDEX: 37.15 KG/M2 | RESPIRATION RATE: 16 BRPM | WEIGHT: 223 LBS | HEIGHT: 65 IN | HEART RATE: 76 BPM

## 2024-04-09 DIAGNOSIS — H92.13 OTORRHEA OF BOTH EARS: ICD-10-CM

## 2024-04-09 DIAGNOSIS — H60.503 ACUTE OTITIS EXTERNA OF BOTH EARS, UNSPECIFIED TYPE: ICD-10-CM

## 2024-04-09 PROCEDURE — 99203 OFFICE O/P NEW LOW 30 MIN: CPT | Performed by: NURSE PRACTITIONER

## 2024-04-09 PROCEDURE — 3079F DIAST BP 80-89 MM HG: CPT | Performed by: NURSE PRACTITIONER

## 2024-04-09 PROCEDURE — 3077F SYST BP >= 140 MM HG: CPT | Performed by: NURSE PRACTITIONER

## 2024-04-09 RX ORDER — OFLOXACIN 3 MG/ML
5 SOLUTION AURICULAR (OTIC) DAILY
Qty: 7 ML | Refills: 0 | Status: SHIPPED | OUTPATIENT
Start: 2024-04-09 | End: 2024-04-16

## 2024-04-09 NOTE — PROGRESS NOTES
Wu Storm is a 78 y.o. male who presents for Otalgia (Left ear pain x 2 weeks)      HPI  This is a new problem. Wu Storm is a 78 y.o. patient who presents to urgent care with c/o: ear drainage from both ears  for 2 weeks. Pain in left ear x 1 day. Pain radiating down to jaw.   \tx tried: hydrogen peroxide. It did not help.     ROS See HPI    Allergies:       Allergies   Allergen Reactions    Lidocaine-Transparent Dressing Rash     Lidocaine patch causes rash       PMSFS Hx:  Past Medical History:   Diagnosis Date    Diabetes     Glaucoma     High cholesterol     Hyperlipidemia     Hypertension     Indigestion      Past Surgical History:   Procedure Laterality Date    LUMBAR LAMINECTOMY DISKECTOMY N/A 3/16/2023    Procedure: POSTERIOR L2-5 LAMINECTOMY, L2-S1 MEDIAL FACETECTOMY AND FORAMINOTOMY;  Surgeon: Jeremi Berg M.D.;  [facility]: SURGERY [facility];  Service: Neurosurgery    KNEE ARTHROPLASTY TOTAL Right     TRANS URETHRAL RESECTION PROSTATE       No family history on file.  Social History     Tobacco Use    Smoking status: Former    Smokeless tobacco: Not on file   Substance Use Topics    Alcohol use: Yes     Comment: 1 time a month       Problems:   Patient Active Problem List   Diagnosis    Lumbar stenosis with neurogenic claudication       Medications:   Current Outpatient Medications on File Prior to Visit   Medication Sig Dispense Refill    tizanidine (ZANAFLEX) 4 MG Tab Take 0.5 Tablets by mouth every 8 hours as needed (muscle spasm). 30 Tablet 0    Ferrous Sulfate (IRON PO) Take 1 Capsule by mouth.      VITAMIN D PO Take 2,000 Int'l Units/day by mouth every day.      finasteride (PROSCAR) 5 MG Tab Take 5 mg by mouth every day.      terazosin (HYTRIN) 2 MG Cap Take 2 mg by mouth every evening.      Pantoprazole Sodium (PROTONIX PO) Take  by mouth every day.      simvastatin (ZOCOR) 40 MG TABS Take 40 mg by mouth every evening.      lisinopril (PRINIVIL) 20 MG TABS Take 20 mg by mouth every day.  "      No current facility-administered medications on file prior to visit.        Objective:     BP (!) 142/82   Pulse 76   Temp 36.4 °C (97.6 °F)   Resp 16   Ht 1.651 m (5' 5\")   Wt 101 kg (223 lb)   SpO2 95%   BMI 37.11 kg/m²     Physical Exam  Vitals and nursing note reviewed.   Constitutional:       General: He is not in acute distress.     Appearance: He is well-developed.   HENT:      Head: Normocephalic.      Right Ear: Hearing, tympanic membrane, ear canal and external ear normal. Drainage and swelling present.      Left Ear: Hearing, tympanic membrane, ear canal and external ear normal. Drainage and swelling present.      Ears:      Comments: Unable to visualize TM on either side due to copious amount of creamy drainage in both ear canals.     Mouth/Throat:      Lips: Pink.      Mouth: Mucous membranes are moist.   Eyes:      Pupils: Pupils are equal, round, and reactive to light.   Cardiovascular:      Rate and Rhythm: Normal rate and regular rhythm.      Pulses: Normal pulses.      Heart sounds: Normal heart sounds.   Pulmonary:      Effort: Pulmonary effort is normal.   Musculoskeletal:      Cervical back: Normal range of motion and neck supple.   Lymphadenopathy:      Head:      Right side of head: No tonsillar adenopathy.      Left side of head: No tonsillar adenopathy.      Cervical: No cervical adenopathy.   Skin:     General: Skin is warm and dry.      Capillary Refill: Capillary refill takes less than 2 seconds.   Neurological:      Mental Status: He is alert and oriented to person, place, and time.   Psychiatric:         Mood and Affect: Mood normal.         Speech: Speech normal.         Behavior: Behavior normal. Behavior is cooperative.         Thought Content: Thought content normal.         Assessment /Associated Orders:      1. Acute otitis externa of both ears, unspecified type  ofloxacin otic sol (FLOXIN OTIC) 0.3 % Solution      2. Otorrhea of both ears              Medical " Decision Making:    Wu is a very pleasant 78 y.o. male who is clinically stable at today's acute urgent care visit.  No acute distress noted.  VSS. Appropriate for outpatient care at this time.   Acute problem today with uncertain prognosis.   Educated in proper administration of  prescription medication(s) ordered today including safety, possible SE, risks, benefits, rationale and alternatives to therapy.   Do not put hydrogen peroxide in his ear  OTC  analgesic of choice prn pain. Follow manufactures dosing and safety precautions.     Discussed Dx, management options (risks,benefits, and alternatives to planned treatment), natural progression and supportive care.  Expressed understanding and the treatment plan was agreed upon.   Questions were encouraged and answered   Return to PCP/  urgent care prn if new or worsening sx or if there is no improvement in condition prn.          Please note that this dictation was created using voice recognition software. I have worked with consultants from the vendor as well as technical experts from [facility] to optimize the interface. I have made every reasonable attempt to correct obvious errors, but I expect that there are errors of grammar and possibly content that I did not discover before finalizing the note.  This note was electronically signed by provider

## (undated) DEVICE — DRESSING TRANSPARENT FILM TEGADERM 2.375 X 2.75"  (100EA/BX)"

## (undated) DEVICE — DRAPE 36X28IN RAD CARM BND BG - (25/CA) O

## (undated) DEVICE — GLOVE BIOGEL PI ORTHO SZ 6 1/2 SURGICAL PF LF (40PR/BX)

## (undated) DEVICE — TOOL MR8 14CM BALL SYM-TRI 5MM DIAMETER (1/EA)

## (undated) DEVICE — GLOVE BIOGEL PI INDICATOR SZ 6.0 SURGICAL PF LF -(200PR/CA)

## (undated) DEVICE — HEADREST PRONEVIEW LARGE - (10/CA)

## (undated) DEVICE — CATHETER IV SAFETY 14 GA X 2 IN (200/CA)

## (undated) DEVICE — ELECTRODE DUAL RETURN W/ CORD - (50/PK)

## (undated) DEVICE — SENSOR OXIMETER ADULT SPO2 RD SET (20EA/BX)

## (undated) DEVICE — SYRINGE NON SAFETY 10 CC 20 GA X 1-1/2 IN (100/BX 4BX/CA)

## (undated) DEVICE — GLOVE BIOGEL PI INDICATOR SZ 7.0 SURGICAL PF LF - (50/BX 4BX/CA)

## (undated) DEVICE — GOWN SURGEONS X-LARGE - DISP. (30/CA)

## (undated) DEVICE — TUBING C&T SET FLYING LEADS DRAIN TUBING (10EA/BX)

## (undated) DEVICE — KIT EVACUATER 3 SPRING PVC LF 1/8 DRAIN SIZE (10EA/CA)"

## (undated) DEVICE — COVER LIGHT HANDLE ALC PLUS DISP (18EA/BX)

## (undated) DEVICE — GLOVE BIOGEL INDICATOR SZ 8 SURGICAL PF LTX - (50/BX 4BX/CA)

## (undated) DEVICE — SET LEADWIRE 5 LEAD BEDSIDE DISPOSABLE ECG (1SET OF 5/EA)

## (undated) DEVICE — ARMREST CRADLE FOAM - (2PR/PK 12PR/CA)

## (undated) DEVICE — DRAPE SURG STERI-DRAPE 7X11OD - (40EA/CA)

## (undated) DEVICE — BLANKET WARMING LOWER BODY (10EA/CA)

## (undated) DEVICE — DRAPE STRLE REG TOWEL 18X24 - (10/BX 4BX/CA)"

## (undated) DEVICE — BOVIE BLADE COATED &INSULATED - 25/PK

## (undated) DEVICE — SUTURE 0 VICRYL PLUS CT-1 - 8 X 18 INCH (12/BX)

## (undated) DEVICE — DISPOSABLE BIPOLAR FORCEPS

## (undated) DEVICE — TUBING CLEARLINK DUO-VENT - C-FLO (48EA/CA)

## (undated) DEVICE — TUBE E-T HI-LO CUFF 7.0MM (10EA/PK)

## (undated) DEVICE — SUTURE GENERAL

## (undated) DEVICE — SUTURE 2-0 VICRYL PLUS CT-1 - 8 X 18 INCH(12/BX)

## (undated) DEVICE — Device

## (undated) DEVICE — TOWEL STOP TIMEOUT SAFETY FLAG (40EA/CA)

## (undated) DEVICE — SODIUM CHL IRRIGATION 0.9% 1000ML (12EA/CA)

## (undated) DEVICE — SURGIFOAM (12X7) - (12EA/CA)

## (undated) DEVICE — SUTURE 4-0 MONOCRYL PLUS PS-1 - 27 INCH (36/BX)

## (undated) DEVICE — GLOVE SZ 6.5 BIOGEL PI MICRO - PF LF (50PR/BX)

## (undated) DEVICE — GLOVE BIOGEL PI INDICATOR SZ 6.5 SURGICAL PF LF - (50/BX 4BX/CA)

## (undated) DEVICE — NEEDLE NON-SAFETY HYPO 21 GA X 1 1/2 IN HYPO (100/BX)

## (undated) DEVICE — LACTATED RINGERS INJ 1000 ML - (14EA/CA 60CA/PF)

## (undated) DEVICE — DRAPE IOBAN II INCISE 23X17 - (10EA/BX 4BX/CA)

## (undated) DEVICE — GLOVE BIOGEL PI ORTHO SZ 6 SURGICAL PF LF (40PR/BX)

## (undated) DEVICE — SET EXTENSION WITH 2 PORTS (48EA/CA) ***PART #2C8610 IS A SUBSTITUTE*****

## (undated) DEVICE — SUCTION INSTRUMENT YANKAUER BULBOUS TIP W/O VENT (50EA/CA)

## (undated) DEVICE — KIT SURGIFLO W/OUT THROMBIN - (6EA/CA)

## (undated) DEVICE — LACTATED RINGERS INJ. 500 ML - (24EA/CA)

## (undated) DEVICE — GLOVE BIOGEL SZ 8 SURGICAL PF LTX - (50PR/BX 4BX/CA)

## (undated) DEVICE — PATTIES SURG NEURO X-RAY 1X1 (10EA/PK 20PK/CA)

## (undated) DEVICE — CANISTER SUCTION 3000ML MECHANICAL FILTER AUTO SHUTOFF MEDI-VAC NONSTERILE LF DISP  (40EA/CA)

## (undated) DEVICE — SEALER BIPOLAR 2.3 AQUAMANTYS